# Patient Record
Sex: MALE | Race: BLACK OR AFRICAN AMERICAN | NOT HISPANIC OR LATINO | URBAN - METROPOLITAN AREA
[De-identification: names, ages, dates, MRNs, and addresses within clinical notes are randomized per-mention and may not be internally consistent; named-entity substitution may affect disease eponyms.]

---

## 2021-09-01 ENCOUNTER — EMERGENCY (EMERGENCY)
Facility: HOSPITAL | Age: 66
LOS: 1 days | Discharge: HOME | End: 2021-09-01
Admitting: EMERGENCY MEDICINE
Payer: COMMERCIAL

## 2021-09-01 VITALS
WEIGHT: 209.44 LBS | SYSTOLIC BLOOD PRESSURE: 183 MMHG | HEART RATE: 90 BPM | TEMPERATURE: 98 F | DIASTOLIC BLOOD PRESSURE: 124 MMHG | RESPIRATION RATE: 16 BRPM | OXYGEN SATURATION: 98 %

## 2021-09-01 VITALS
OXYGEN SATURATION: 98 % | DIASTOLIC BLOOD PRESSURE: 88 MMHG | RESPIRATION RATE: 18 BRPM | HEART RATE: 71 BPM | SYSTOLIC BLOOD PRESSURE: 154 MMHG | TEMPERATURE: 99 F

## 2021-09-01 DIAGNOSIS — Z20.822 CONTACT WITH AND (SUSPECTED) EXPOSURE TO COVID-19: ICD-10-CM

## 2021-09-01 DIAGNOSIS — I51.7 CARDIOMEGALY: ICD-10-CM

## 2021-09-01 DIAGNOSIS — E16.2 HYPOGLYCEMIA, UNSPECIFIED: ICD-10-CM

## 2021-09-01 LAB
ALBUMIN SERPL ELPH-MCNC: 4.1 G/DL — SIGNIFICANT CHANGE UP (ref 3.4–5)
ALP SERPL-CCNC: 73 U/L — SIGNIFICANT CHANGE UP (ref 40–120)
ALT FLD-CCNC: 26 U/L — SIGNIFICANT CHANGE UP (ref 12–42)
AMPHET UR-MCNC: NEGATIVE — SIGNIFICANT CHANGE UP
ANION GAP SERPL CALC-SCNC: 8 MMOL/L — LOW (ref 9–16)
APPEARANCE UR: CLEAR — SIGNIFICANT CHANGE UP
AST SERPL-CCNC: 27 U/L — SIGNIFICANT CHANGE UP (ref 15–37)
BARBITURATES UR SCN-MCNC: NEGATIVE — SIGNIFICANT CHANGE UP
BASOPHILS # BLD AUTO: 0.07 K/UL — SIGNIFICANT CHANGE UP (ref 0–0.2)
BASOPHILS NFR BLD AUTO: 0.6 % — SIGNIFICANT CHANGE UP (ref 0–2)
BENZODIAZ UR-MCNC: NEGATIVE — SIGNIFICANT CHANGE UP
BILIRUB SERPL-MCNC: 0.4 MG/DL — SIGNIFICANT CHANGE UP (ref 0.2–1.2)
BILIRUB UR-MCNC: NEGATIVE — SIGNIFICANT CHANGE UP
BUN SERPL-MCNC: 12 MG/DL — SIGNIFICANT CHANGE UP (ref 7–23)
CALCIUM SERPL-MCNC: 9.2 MG/DL — SIGNIFICANT CHANGE UP (ref 8.5–10.5)
CHLORIDE SERPL-SCNC: 108 MMOL/L — SIGNIFICANT CHANGE UP (ref 96–108)
CO2 SERPL-SCNC: 28 MMOL/L — SIGNIFICANT CHANGE UP (ref 22–31)
COCAINE METAB.OTHER UR-MCNC: NEGATIVE — SIGNIFICANT CHANGE UP
COLOR SPEC: YELLOW — SIGNIFICANT CHANGE UP
CREAT SERPL-MCNC: 1.2 MG/DL — SIGNIFICANT CHANGE UP (ref 0.5–1.3)
DIFF PNL FLD: ABNORMAL
EOSINOPHIL # BLD AUTO: 0.1 K/UL — SIGNIFICANT CHANGE UP (ref 0–0.5)
EOSINOPHIL NFR BLD AUTO: 0.8 % — SIGNIFICANT CHANGE UP (ref 0–6)
ETHANOL SERPL-MCNC: <3 MG/DL — SIGNIFICANT CHANGE UP
GLUCOSE BLDC GLUCOMTR-MCNC: 101 MG/DL — HIGH (ref 70–99)
GLUCOSE BLDC GLUCOMTR-MCNC: 116 MG/DL — HIGH (ref 70–99)
GLUCOSE BLDC GLUCOMTR-MCNC: 79 MG/DL — SIGNIFICANT CHANGE UP (ref 70–99)
GLUCOSE SERPL-MCNC: 67 MG/DL — LOW (ref 70–99)
GLUCOSE UR QL: >=1000
HCT VFR BLD CALC: 39.8 % — SIGNIFICANT CHANGE UP (ref 39–50)
HGB BLD-MCNC: 13.6 G/DL — SIGNIFICANT CHANGE UP (ref 13–17)
IMM GRANULOCYTES NFR BLD AUTO: 0.7 % — SIGNIFICANT CHANGE UP (ref 0–1.5)
KETONES UR-MCNC: NEGATIVE — SIGNIFICANT CHANGE UP
LACTATE SERPL-SCNC: 2 MMOL/L — SIGNIFICANT CHANGE UP (ref 0.4–2)
LEUKOCYTE ESTERASE UR-ACNC: NEGATIVE — SIGNIFICANT CHANGE UP
LIDOCAIN IGE QN: 110 U/L — SIGNIFICANT CHANGE UP (ref 73–393)
LYMPHOCYTES # BLD AUTO: 18.2 % — SIGNIFICANT CHANGE UP (ref 13–44)
LYMPHOCYTES # BLD AUTO: 2.23 K/UL — SIGNIFICANT CHANGE UP (ref 1–3.3)
MAGNESIUM SERPL-MCNC: 2 MG/DL — SIGNIFICANT CHANGE UP (ref 1.6–2.6)
MCHC RBC-ENTMCNC: 29.8 PG — SIGNIFICANT CHANGE UP (ref 27–34)
MCHC RBC-ENTMCNC: 34.2 GM/DL — SIGNIFICANT CHANGE UP (ref 32–36)
MCV RBC AUTO: 87.1 FL — SIGNIFICANT CHANGE UP (ref 80–100)
METHADONE UR-MCNC: NEGATIVE — SIGNIFICANT CHANGE UP
MONOCYTES # BLD AUTO: 0.81 K/UL — SIGNIFICANT CHANGE UP (ref 0–0.9)
MONOCYTES NFR BLD AUTO: 6.6 % — SIGNIFICANT CHANGE UP (ref 2–14)
NEUTROPHILS # BLD AUTO: 8.98 K/UL — HIGH (ref 1.8–7.4)
NEUTROPHILS NFR BLD AUTO: 73.1 % — SIGNIFICANT CHANGE UP (ref 43–77)
NITRITE UR-MCNC: NEGATIVE — SIGNIFICANT CHANGE UP
NRBC # BLD: 0 /100 WBCS — SIGNIFICANT CHANGE UP (ref 0–0)
NT-PROBNP SERPL-SCNC: 48 PG/ML — SIGNIFICANT CHANGE UP
OPIATES UR-MCNC: NEGATIVE — SIGNIFICANT CHANGE UP
PCP SPEC-MCNC: SIGNIFICANT CHANGE UP
PCP UR-MCNC: NEGATIVE — SIGNIFICANT CHANGE UP
PH UR: 7 — SIGNIFICANT CHANGE UP (ref 5–8)
PLATELET # BLD AUTO: 328 K/UL — SIGNIFICANT CHANGE UP (ref 150–400)
POTASSIUM SERPL-MCNC: 2.9 MMOL/L — CRITICAL LOW (ref 3.5–5.3)
POTASSIUM SERPL-SCNC: 2.9 MMOL/L — CRITICAL LOW (ref 3.5–5.3)
PROT SERPL-MCNC: 8 G/DL — SIGNIFICANT CHANGE UP (ref 6.4–8.2)
PROT UR-MCNC: NEGATIVE MG/DL — SIGNIFICANT CHANGE UP
RBC # BLD: 4.57 M/UL — SIGNIFICANT CHANGE UP (ref 4.2–5.8)
RBC # FLD: 14.5 % — SIGNIFICANT CHANGE UP (ref 10.3–14.5)
RBC CASTS # UR COMP ASSIST: ABNORMAL /HPF
SARS-COV-2 RNA SPEC QL NAA+PROBE: SIGNIFICANT CHANGE UP
SODIUM SERPL-SCNC: 144 MMOL/L — SIGNIFICANT CHANGE UP (ref 132–145)
SP GR SPEC: 1.01 — SIGNIFICANT CHANGE UP (ref 1–1.03)
THC UR QL: POSITIVE
TROPONIN I SERPL-MCNC: <0.017 NG/ML — LOW (ref 0.02–0.06)
UROBILINOGEN FLD QL: 0.2 E.U./DL — SIGNIFICANT CHANGE UP
WBC # BLD: 12.27 K/UL — HIGH (ref 3.8–10.5)
WBC # FLD AUTO: 12.27 K/UL — HIGH (ref 3.8–10.5)
WBC UR QL: < 5 /HPF — SIGNIFICANT CHANGE UP

## 2021-09-01 PROCEDURE — 93010 ELECTROCARDIOGRAM REPORT: CPT

## 2021-09-01 PROCEDURE — 71045 X-RAY EXAM CHEST 1 VIEW: CPT | Mod: 26

## 2021-09-01 PROCEDURE — 99285 EMERGENCY DEPT VISIT HI MDM: CPT

## 2021-09-01 RX ORDER — POTASSIUM CHLORIDE 20 MEQ
10 PACKET (EA) ORAL
Refills: 0 | Status: COMPLETED | OUTPATIENT
Start: 2021-09-01 | End: 2021-09-01

## 2021-09-01 RX ORDER — SODIUM CHLORIDE 9 MG/ML
1000 INJECTION, SOLUTION INTRAVENOUS
Refills: 0 | Status: DISCONTINUED | OUTPATIENT
Start: 2021-09-01 | End: 2021-09-04

## 2021-09-01 RX ORDER — DEXTROSE 50 % IN WATER 50 %
50 SYRINGE (ML) INTRAVENOUS ONCE
Refills: 0 | Status: COMPLETED | OUTPATIENT
Start: 2021-09-01 | End: 2021-09-01

## 2021-09-01 RX ORDER — SODIUM CHLORIDE 9 MG/ML
1000 INJECTION INTRAMUSCULAR; INTRAVENOUS; SUBCUTANEOUS ONCE
Refills: 0 | Status: COMPLETED | OUTPATIENT
Start: 2021-09-01 | End: 2021-09-01

## 2021-09-01 RX ADMIN — Medication 10 MILLIEQUIVALENT(S): at 14:14

## 2021-09-01 RX ADMIN — Medication 100 MILLIEQUIVALENT(S): at 12:36

## 2021-09-01 RX ADMIN — SODIUM CHLORIDE 200 MILLILITER(S): 9 INJECTION, SOLUTION INTRAVENOUS at 12:36

## 2021-09-01 RX ADMIN — Medication 50 MILLILITER(S): at 09:52

## 2021-09-01 RX ADMIN — Medication 50 MILLILITER(S): at 09:51

## 2021-09-01 RX ADMIN — Medication 100 MILLIEQUIVALENT(S): at 10:55

## 2021-09-01 RX ADMIN — Medication 100 MILLIEQUIVALENT(S): at 14:08

## 2021-09-01 RX ADMIN — SODIUM CHLORIDE 1000 MILLILITER(S): 9 INJECTION INTRAMUSCULAR; INTRAVENOUS; SUBCUTANEOUS at 12:17

## 2021-09-01 NOTE — ED ADULT NURSE NOTE - OBJECTIVE STATEMENT
65 y/o male who was found  at work altered with a FS of 22 in  field-  pt on arrival is awake but confused and unaware of what had happened- oral glucose given by EMS repeat FS here is 65- Pt states he took his insulin and forgot to eat breakfast- IV in place, labs drawn and sent- 2 amp of Dextrose given - pt states he is feeling much better and is A+Ox4-

## 2021-09-01 NOTE — ED PROVIDER NOTE - PATIENT PORTAL LINK FT
You can access the FollowMyHealth Patient Portal offered by [facility] by registering at the following website: [email or web address]. By joining [employer]’s FollowMyHealth portal, you will also be able to view your health information using other applications (apps) compatible with our system.

## 2021-09-01 NOTE — ED ADULT TRIAGE NOTE - CHIEF COMPLAINT QUOTE
Patient diaphoretic from work, as per EMS pt glucose was 22 md/dl on scene oral glucose given. On arrival to ED pt altered

## 2021-09-01 NOTE — ED PROVIDER NOTE - CROS ED [facility] ALL NEG
ER Documentation


Chief Complaint


Chief Complaint





CHEST PRESSURE ON AND OFF FOR A MONTH; MOSTLY AT NIGHT





HPI


This is a 34-year-old male presents to the emergency department complaining of 


chest pain for 1 month.  He states that it been intermittent.  It is localized 


to the mid sternum.  He states it is a sharp shooting pain.  There is no 


pressure-like sensation.  He does state it is worse at night.  He has no 


shortness of breath at rest or exertion.  He said no fevers or shaking or 


chills.  He denies any hemoptysis no hematemesis or melanotic stools.  He denies


a productive or nonproductive cough.  He has no night sweats or weight loss.  He


denies any recent travel or prolonged immobilization.  The patient has no family


history of coronary artery disease in his first-degree relatives.  He does state


that several months ago his father  of lung carcinoma.  He does not smoke 


tobacco.  He did not take any analgesic medication prior to arrival.





ROS


All systems reviewed and are negative except as per history of present illness.





Medications


Home Meds


Active Scripts


Naproxen* (Naprosyn*) 500 Mg Tablet, 500 MG PO BID PRN for PAIN AND/OR 


INFLAMMATION, #30 TAB


   Prov:GINNY HOFFMANN MD         19





Allergies


Allergies:  


Coded Allergies:  


     No Known Drug Allergies (Verified  Allergy, Unknown, 19)





PMhx/Soc


Medical and Surgical Hx:  pt denies Medical Hx, pt denies Surgical Hx


Hx Alcohol Use:  No


Hx Substance Use:  No


Hx Tobacco Use:  No


Smoking Status:  Never smoker





Physical Exam


Vitals





Vital Signs


  Date      Temp  Pulse  Resp  B/P (MAP)   Pulse Ox  O2          O2 Flow    FiO2


Time                                                 Delivery    Rate


   19  97.8     66    18      120/67        98  Room Air


     18:30                           (84)


   19  97.8     73    18      127/82        98


     18:15                           (97)





Physical Exam


Constitutional:Well-developed. Well-nourished.


HEENT:Normocephalic. Atraumatic.Pupils were equal round reactive to light. Moist


mucous membranes.No tonsillar exudates.


Neck: No nuchal rigidity. No lymphadenopathy. No posterior cervical spine 


tenderness or step-offs.


Respiratory: Not using accessory muscles of respiration.Lungs were clear to 


auscultation bilaterally. No rhonchi. No rales. No wheezing. 


Cardiovascular: Regular rate regular rhythm.No murmurs. No rubs were 


appreciated.S1, S2 normal. Distal pulses are palpable 2+ bilaterally.  Mid 


substernal reproducible tenderness with no crepitus no ecchymosis no flail chest


GI: Abdomen was soft. Nontender. Non Distended. No pulsatile abdominal masses or


bruits. No rebound. No guarding. Bowel sounds were present and normal. 


Muscle skeletal: Full range of motion of both the upper and lower extremities 


bilaterally.Normal muscle tone.No assymetrical calf tenderness or swelling. 


Skin: No petechia, no purpura. No lesions on the palms or the soles of the feet.


No maculopapular rash.


NEURO: Patient was alert, awake, orientated x3.No facial droop. Gait observed 


and normal with no ataxia.Speech had regular rate and rhythm. No focal 


neurological deficits.


Result Diagram:  


19





Results 24 hrs





Laboratory Tests


       Test
                                 19
18:36  19
18:37


       White Blood Count                      6.2 10^3/ul


       Red Blood Count                       5.29 10^6/ul


       Hemoglobin                               14.5 g/dl


       Hematocrit                                  44.6 %


       Mean Corpuscular Volume                    84.3 fl


       Mean Corpuscular Hemoglobin                27.4 pg


       Mean Corpuscular Hemoglobin
Concent     32.5 g/dl 
  



       Red Cell Distribution Width                 13.4 %


       Platelet Count                         263 10^3/UL


       Mean Platelet Volume                       10.1 fl


       Immature Granulocytes %                    0.200 %


       Neutrophils %                               46.9 %


       Lymphocytes %                               43.3 %


       Monocytes %                                  7.6 %


       Eosinophils %                                1.5 %


       Basophils %                                  0.5 %


       Nucleated Red Blood Cells %            0.0 /100WBC


       Immature Granulocytes #              0.010 10^3/ul


       Neutrophils #                          2.9 10^3/ul


       Lymphocytes #                          2.7 10^3/ul


       Monocytes #                            0.5 10^3/ul


       Eosinophils #                          0.1 10^3/ul


       Basophils #                            0.0 10^3/ul


       Nucleated Red Blood Cells #            0.0 10^3/ul


       Prothrombin Time                          12.3 Sec


       Prothrombin Time Ratio                         1.0


       INR International Normalized
Ratio           0.90 
  



       Activated Partial
Thromboplast Time      28.0 Sec 
  



       Sodium Level                            138 mmol/L


       Potassium Level                         3.9 mmol/L


       Chloride Level                          101 mmol/L


       Carbon Dioxide Level                     29 mmol/L


       Anion Gap                                        8


       Blood Urea Nitrogen                       10 mg/dl


       Creatinine                              0.88 mg/dl


       Est Glomerular Filtrat Rate
mL/min   > 60 mL/min 
   



       Glucose Level                             83 mg/dl


       Calcium Level                            9.4 mg/dl


       Total Bilirubin                          0.4 mg/dl


       Direct Bilirubin                        0.00 mg/dl


       Indirect Bilirubin                       0.4 mg/dl


       Aspartate Amino Transf
(AST/SGOT)         45 IU/L 
  



       Alanine Aminotransferase
(ALT/SGPT)       71 IU/L 
  



       Alkaline Phosphatase                       63 IU/L


       Creatine Kinase                           599 IU/L


       Creatine Kinase Index                          0.9


       Creatinine Kinase MB (Mass)             5.48 ng/ml


       Troponin I                           < 0.012 ng/ml


       B-Type Natriuretic Peptide                14 PG/ML


       Total Protein                             8.0 g/dl


       Albumin                                   4.5 g/dl


       Globulin                                 3.50 g/dl


       Albumin/Globulin Ratio                        1.28


       Lipase                                      89 U/L


       Ethyl Alcohol Level                  < 10.0 mg/dl


       Urine Opiates Screen                                 Negative


       Urine Barbiturates                                   Negative


       Urine Amphetamines Screen                            Negative


       Urine Benzodiazepines Screen                         Negative


       Urine Cocaine Screen                                 Negative


       Urine Cannabinoids                                   Negative





Current Medications


 Medications
   Dose
          Sig/Kaye
       Start Time
   Status  Last


 (Trade)       Ordered        Route
 PRN     Stop Time              Admin
Dose


                              Reason                                Admin


 Aspirin
       325 mg         ONCE  STAT
    19       DC           19


(Aspirin)                     PO
            18:23
                       18:57



                                             19 18:29


 Ketorolac
     30 mg          ONCE  STAT
    19       DC       



Tromethamine
                 IV
            19:46



 (Toradol)                                   19 19:50








Procedures/MDM


The patient presented to the emergency department complaining of chest pain. My 


clinical evaluation and workup was to distinguish minor causes of chest pain 


from acute life threatening cardiopulmonary causes such as myocardial 


infarction, pulmonary embolism, aortic dissection, esophageal rupture, cardiac 


tamponade, 





The patient was placed on a cardiac monitor, continuous pulse oximetry and IV 


access established by nursing staff.  The patient received aspirin with no 


improvement of his chest pain.  He did receive IV Toradol.


12 Lead EKG tracing ordered and reviewed by myself showed: 


Normal sinus rhythm of 68 bpm and no arrhythmia.


IN interval normal.


QRS duration normal.


There is a concave upstroke with ST segment elevation in aVL aVF and V3 with no 


reciprocal changes.  There was T wave inversion isolated to lead III.  The EKG 


read acute STEMI however I did not feel this was signs of ST segment elevation.


No ST segment depression. No changes consistent with acute ischemia. 








The patients chest pain was reproduced by palpation and horizontal flexion of 


the arms. It was my clinical impression that the pain was a result of 


inflammation of the skin and subcutaneous structures of the chest wall versus 


myocardial ischemia.  I felt the patient had low-risk chest pain and could 


therefore be safely discharged with close follow-up.





Departure


Diagnosis:  


   Primary Impression:  


   Costochondritis, acute


Condition:  Fair


Patient Instructions:  Costochondritis











GINNY HOFFMANN MD            2019 20:08
negative...

## 2021-09-01 NOTE — ED PROVIDER NOTE - NSICDXNOPASTSURGICALHX_GEN_ALL_ED
"Nay Anaya Patient Age: 75year old  MESSAGE: Interpreting service used: No      IM/FP- Referral- New Referral Request- External Referral- Outside [facility]-     Has the patient seen any provider in Primary Care within 1 year? Yes-         Referral to Provider:  Dr Tay Pink:  Myla Modi, [address]       Phone number for outside provider referring to:  [phone]    Fax Number for outside provider referring to:  [phone]    Referral requested to be issued by (date)/appointment date:  11.28.20    Provider that is recommending the referral:  Dr Rei Vaughn     Reason for referral request:  Toenails need clipping    Additional Information: n/a    Is caller requesting a call back once the referral has been approved? Yes    Advised caller referral requests may take 7-10 business days to complete. Is patient having new or worsening symptoms? No- Route to provider's clinical support pool         WEIGHT AND HEIGHT:   Wt Readings from Last 1 Encounters:   10/20/20 90.7 kg (200 lb)     Ht Readings from Last 1 Encounters:   10/20/20 5' 10"" (1.778 m)     BMI Readings from Last 1 Encounters:   10/20/20 28.70 kg/mÂ²       ALLERGIES:  Patient has no known allergies. Current Outpatient Medications   Medication   â¢ silver sulfADIAZINE (THERMAZENE) 1 % cream   â¢ fluticasone (FLONASE) 50 MCG/ACT nasal spray   â¢ simvastatin (ZOCOR) 20 MG tablet   â¢ atenolol (TENORMIN) 50 MG tablet   â¢ ibuprofen (MOTRIN) 800 MG tablet   â¢ Cyanocobalamin (VITAMIN B 12 PO)   â¢ Multiple Vitamins-Minerals (CENTRUM SILVER ADULT 50+) Tab   â¢ VITAMIN E PO   â¢ VITAMIN D, ERGOCALCIFEROL, PO     No current facility-administered medications for this visit.       PHARMACY to use: see below           Pharmacy preference(s) on file:   [address] [facility]  [address] [facility] [address]  Phone: [phone] Fax: " [address] to leave response (including medical information) on answering machine  ROUTING: Patient's physician/staff        PCP: Patrick Kraus MD         INS: Payor: Heena Garcia / Plan: [employer] HMO / Product Type: MEDADV   PATIENT ADDRESS:  [address] [place] [id number] <-- Click to add NO significant Past Surgical History

## 2021-09-01 NOTE — ED PROVIDER NOTE - CLINICAL SUMMARY MEDICAL DECISION MAKING FREE TEXT BOX
Patient here for hypoglycemia in the field  given glucose prior to arrival.   Patient asymptomatic in ED.  Plan: labs and infectious workup

## 2021-09-01 NOTE — ED PROVIDER NOTE - OBJECTIVE STATEMENT
65 y/o male here brought in VIA EMS from work for diaphoresis and hypoglycemia. Patient appears well asymptomatic in ED. Denies fever, chills, abdominal pain, change in bowel function, flank pain, rash, HA, dizziness, SOB, CP, palpitations, diaphoresis, cough, and malaise. Patient appears well NAD Stable.

## 2022-12-09 ENCOUNTER — NON-APPOINTMENT (OUTPATIENT)
Age: 67
End: 2022-12-09

## 2022-12-30 PROBLEM — Z78.9 OTHER SPECIFIED HEALTH STATUS: Chronic | Status: ACTIVE | Noted: 2021-09-04

## 2023-01-26 ENCOUNTER — APPOINTMENT (OUTPATIENT)
Dept: ORTHOPEDIC SURGERY | Facility: CLINIC | Age: 68
End: 2023-01-26
Payer: OTHER MISCELLANEOUS

## 2023-01-26 PROBLEM — Z00.00 ENCOUNTER FOR PREVENTIVE HEALTH EXAMINATION: Status: ACTIVE | Noted: 2023-01-26

## 2023-01-26 PROCEDURE — 72170 X-RAY EXAM OF PELVIS: CPT

## 2023-01-26 PROCEDURE — 72110 X-RAY EXAM L-2 SPINE 4/>VWS: CPT

## 2023-01-26 PROCEDURE — 99204 OFFICE O/P NEW MOD 45 MIN: CPT

## 2023-01-26 PROCEDURE — 99072 ADDL SUPL MATRL&STAF TM PHE: CPT

## 2023-01-26 NOTE — IMAGING
[Straightening consistent with spasm] : Straightening consistent with spasm [Facet arthropathy] : Facet arthropathy [Disc space narrowing] : Disc space narrowing [AP] : anteroposterior [There are no fractures, subluxations or dislocations. No significant abnormalities are seen] : There are no fractures, subluxations or dislocations. No significant abnormalities are seen [de-identified] : LSPINE\par Inspection: No rash or ecchymosis\par Palpation: No tenderness to palpation or spasm in bilateral thoracic and lumbar paraspinal musculature, no SI joint tenderness to palpation\par ROM: Full with stiffness\par Strength: 5/5 bilateral hip flexors, knee extensors, ankle dorsiflexors, EHL, ankle plantarflexors\par Sensation: diminished sensation to light touch in RLE above and below the knee\par Provocative maneuvers: Equivocal R straight leg raise, negative L straight leg raise\par  [FreeTextEntry1] : Diffuse DDD, most severe at L4-5/L5-S1

## 2023-01-26 NOTE — ASSESSMENT
[FreeTextEntry1] : L4-5/L5-S1 DDD with RLE radic\par PT, meds\par Patient has failed  conservative care, including medications. Will obtain lumbar MRI to rule out HNP; will also be used to guide potential future injections/surgical management.\par follow up after MRI\par Patient is currently OOW\par Patient's symptoms have improved since seeing PCP 1 month ago. Will trial return to work light duty on 1/31/23, with caution.  No bending/twisting, lifting >10 lbs, or standing for >1 hour. Will reassess at MRI f/u appointment. \par \par NSAIDs- Patient warned of risk of medication to GI tract, increased blood pressure, cardiac risk, and risk of fluid retention.  Advised to clear medication with internist or PCP if any concurrent health problem with heart, blood pressure, or GI system exists.\par \par Gabapentin- Patient advised of sedating effects, instructed not to drive, operate machinery, or take with other sedating medications. Advised of need to taper on/off medication and risk of abruptly stopping gabapentin.\par \par Patient seen by Elsa Dodge PA-C, with and under the supervision of  Dr. Jacques Middleton M.D.

## 2023-01-26 NOTE — HISTORY OF PRESENT ILLNESS
[Lower back] : lower back [6] : 6 [5] : 5 [Dull/Aching] : dull/aching [Intermittent] : intermittent [Meds] : meds [Walking] : walking [de-identified] : WC DOI 12/8/22\par Occupation: maintenance\par 1/26/23- 68 y/o M presents for lower back pain, R>L, X approx. 1 month. Patient was moving heavy tables at work and started feeling pain shortly afterwards. Associated radiation down RLE (glutes,anterolateral thigh, lateral calf, dorsal foot, with numbness in the foot) Aggravated by walking/getting out of bed. Seen by PCP approx. 1 month ago and RXed pain patches and a pain reliever (does not recall names), with partial relief. Denies prior back surgeries/physical therapy. Denies b/b dysfunction. \par \par PMH: HTN, Type II DM (insulin-dependent) [] : no [FreeTextEntry5] : JONAH 67 year old M here for Lower back , onset pain for over 1 month,last saw his PCP was was given a Pain patch and a Pain reliever ,pt is unsure of the names , he  reports pain has decreased \par his pain radiated from his lower back down to his buttock and to the back of his knee  [FreeTextEntry7] : lower back to buttock to back of his knee  [FreeTextEntry9] : pain patch  [de-identified] : 1 month ago  [de-identified] : PCP

## 2023-01-26 NOTE — WORK
[Sprain/Strain] : sprain/strain [Was the competent medical cause of the injury] : was the competent medical cause of the injury [Are consistent with the injury] : are consistent with the injury [Consistent with my objective findings] : consistent with my objective findings [Partial] : partial [Does not reveal pre-existing condition(s) that may affect treatment/prognosis] : does not reveal pre-existing condition(s) that may affect treatment/prognosis [Can return to work with limitations on ______] : can return to work with limitations on [unfilled] [Bending/Twisting] : bending/twisting [Lifting] : lifting [Unknown at this time] : : unknown at this time [Patient] : patient [Rx may affect patient's ability to return to work, make patient drowsy, or other issue] : Rx may affect patient's ability to return to work, make patient drowsy, or other issue. [I provided the services listed above] :  I provided the services listed above. [FreeTextEntry1] : fair

## 2023-01-26 NOTE — RETURN TO WORK/SCHOOL
[Return Date: _____] : as of [unfilled].  This has been discussed in detail with ~Sheldon~ and ~he/she~ understands this. [Light Duty] : light duty [FreeTextEntry1] : Patient will return to work light duty. No bending/twisting, lifting >10 lbs, or standing for >1 hour.

## 2023-02-01 ENCOUNTER — APPOINTMENT (OUTPATIENT)
Dept: MRI IMAGING | Facility: CLINIC | Age: 68
End: 2023-02-01

## 2023-02-09 ENCOUNTER — FORM ENCOUNTER (OUTPATIENT)
Age: 68
End: 2023-02-09

## 2023-02-10 ENCOUNTER — APPOINTMENT (OUTPATIENT)
Dept: MRI IMAGING | Facility: CLINIC | Age: 68
End: 2023-02-10
Payer: OTHER MISCELLANEOUS

## 2023-02-10 PROCEDURE — 99072 ADDL SUPL MATRL&STAF TM PHE: CPT

## 2023-02-10 PROCEDURE — 72148 MRI LUMBAR SPINE W/O DYE: CPT

## 2023-02-16 ENCOUNTER — APPOINTMENT (OUTPATIENT)
Dept: ORTHOPEDIC SURGERY | Facility: CLINIC | Age: 68
End: 2023-02-16
Payer: OTHER MISCELLANEOUS

## 2023-02-16 PROCEDURE — 99215 OFFICE O/P EST HI 40 MIN: CPT

## 2023-02-16 PROCEDURE — 99072 ADDL SUPL MATRL&STAF TM PHE: CPT

## 2023-02-16 NOTE — WORK
[Sprain/Strain] : sprain/strain [Was the competent medical cause of the injury] : was the competent medical cause of the injury [Are consistent with the injury] : are consistent with the injury [Consistent with my objective findings] : consistent with my objective findings [Partial] : partial [Does not reveal pre-existing condition(s) that may affect treatment/prognosis] : does not reveal pre-existing condition(s) that may affect treatment/prognosis [Unknown at this time] : : unknown at this time [Patient] : patient [Rx may affect patient's ability to return to work, make patient drowsy, or other issue] : Rx may affect patient's ability to return to work, make patient drowsy, or other issue. [I provided the services listed above] :  I provided the services listed above. [Other: ___] : [unfilled] [Can return to work without limitations on ______] : can return to work without limitations on [unfilled] [FreeTextEntry1] : fair

## 2023-02-16 NOTE — ASSESSMENT
[FreeTextEntry1] :  L4/5 bulge with R>L LR and foraminal HNP;\par L5/S1 HNP with b/l NF narrowing\par PT, meds\par \par Patient is currently OOW\par Patient's symptoms have improved since last visit, will trial return to work full duty and re-assess at f/u or sooner should symptoms worsen. \par \par NSAIDs- Patient warned of risk of medication to GI tract, increased blood pressure, cardiac risk, and risk of fluid retention.  Advised to clear medication with internist or PCP if any concurrent health problem with heart, blood pressure, or GI system exists.\par \par Gabapentin- Patient advised of sedating effects, instructed not to drive, operate machinery, or take with other sedating medications. Advised of need to taper on/off medication and risk of abruptly stopping gabapentin.\par \par PCP for renal findings

## 2023-02-16 NOTE — HISTORY OF PRESENT ILLNESS
[Lower back] : lower back [6] : 6 [5] : 5 [Dull/Aching] : dull/aching [Intermittent] : intermittent [Meds] : meds [Walking] : walking [de-identified] : L spine MRI 2/10/23-\par Findings: Straightening of lordosis. No compression fracture. Multilevel Schmorl's nodes. Modic type 2 \par endplate change at L5-S1. No compression fracture. No spondylolysis.\par T12-L1: No herniation, foraminal stenosis, or central stenosis.\par L1-L2: Bulge, facet hypertrophy with no herniation, foraminal stenosis, or central stenosis.\par L2-L3: Minor retrolisthesis. Bulge, facet hypertrophy, ligamentum flavum hypertrophy, and left facet effusion \par with foraminal stenosis. Asymmetric to right herniation impressing on the thecal sac. Posterior epidural \par lipomatosis. Mild central stenosis.\par L3-L4: Bulge, facet hypertrophy, and ligamentum flavum hypertrophy with left foraminal herniation and left \par foraminal stenosis. No central herniation. Posterior epidural lipomatosis. Mild central stenosis.\par L4-L5: Broad bulge, spondylotic ridge, facet arthrosis, ligamentum flavum hypertrophy, and left facet effusion.\par Foraminal extension and bulges with right foraminal herniation and foraminal stenosis right greater than left.\par Broad central herniation with posterior epidural lipomatosis and mild-to-moderate central stenosis.\par L5-S1: Minor retrolisthesis. Broad bulge, spondylotic ridge, facet arthrosis, ligamentum flavum hypertrophy\par with foraminal extension of bulges, and foraminal stenosis. Broad central herniation. Mild central stenosis.\par Ind. 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 1 month. Patient was moving heavy tables at work and started feeling pain shortly afterwards. Associated radiation down RLE (glutes,anterolateral thigh, lateral calf, dorsal foot, with numbness in the foot) Aggravated by walking/getting out of bed. Seen by PCP approx. 1 month ago and RXed pain patches and a pain reliever (does not recall names), with partial relief. Denies prior back surgeries/physical therapy. Denies b/b dysfunction. \par \par PMH: HTN, Type II DM (insulin-dependent)\par \par 2/16/23- MRI f/u. Still having some pain down the RLE posterolaterally to the knee, though improving with time and meds [] : no [FreeTextEntry5] : JONAH 67 year old M here for MRI review of the L-spine,reports some improvement since last visit  [FreeTextEntry7] : lower back to buttock to back of his knee  [FreeTextEntry9] : pain patch  [de-identified] : 1 month ago  [de-identified] : PCP

## 2023-02-16 NOTE — RETURN TO WORK/SCHOOL
[Return Date: _____] : as of [unfilled].  This has been discussed in detail with ~Sheldon~ and ~he/she~ understands this. [Full Duty] : full duty

## 2023-02-16 NOTE — IMAGING
[Straightening consistent with spasm] : Straightening consistent with spasm [Facet arthropathy] : Facet arthropathy [Disc space narrowing] : Disc space narrowing [AP] : anteroposterior [There are no fractures, subluxations or dislocations. No significant abnormalities are seen] : There are no fractures, subluxations or dislocations. No significant abnormalities are seen [de-identified] : LSPINE\par Palpation: No tenderness to palpation or spasm in bilateral thoracic and lumbar paraspinal musculature, no SI joint tenderness to palpation\par ROM: Full with stiffness\par Strength: 5/5 bilateral hip flexors, knee extensors, ankle dorsiflexors, EHL, ankle plantarflexors\par Sensation: diminished sensation to light touch in RLE above and below the knee\par Provocative maneuvers: Equivocal R straight leg raise, negative L straight leg raise\par  [FreeTextEntry1] : Diffuse DDD, most severe at L4-5/L5-S1

## 2023-02-16 NOTE — REASON FOR VISIT
Take cephalexin 500 mg four times daily for 7 days.    Let us know if your knee is not getting better.    [FreeTextEntry2] : Follow Up; MRI review of the L -spine

## 2023-03-10 ENCOUNTER — FORM ENCOUNTER (OUTPATIENT)
Age: 68
End: 2023-03-10

## 2023-03-30 ENCOUNTER — APPOINTMENT (OUTPATIENT)
Dept: ORTHOPEDIC SURGERY | Facility: CLINIC | Age: 68
End: 2023-03-30
Payer: OTHER MISCELLANEOUS

## 2023-03-30 PROCEDURE — 99213 OFFICE O/P EST LOW 20 MIN: CPT

## 2023-03-30 NOTE — WORK
[Sprain/Strain] : sprain/strain [Other: ___] : [unfilled] [Was the competent medical cause of the injury] : was the competent medical cause of the injury [Are consistent with the injury] : are consistent with the injury [Consistent with my objective findings] : consistent with my objective findings [Partial] : partial [Does not reveal pre-existing condition(s) that may affect treatment/prognosis] : does not reveal pre-existing condition(s) that may affect treatment/prognosis [Can return to work without limitations on ______] : can return to work without limitations on [unfilled] [Unknown at this time] : : unknown at this time [Patient] : patient [Rx may affect patient's ability to return to work, make patient drowsy, or other issue] : Rx may affect patient's ability to return to work, make patient drowsy, or other issue. [I provided the services listed above] :  I provided the services listed above. [FreeTextEntry1] : fair

## 2023-03-30 NOTE — ASSESSMENT
[FreeTextEntry1] :  L4/5 bulge with R>L LR and foraminal HNP;\par L5/S1 HNP with b/l NF narrowing\par PT, meds\par \par \par NSAIDs- Patient warned of risk of medication to GI tract, increased blood pressure, cardiac risk, and risk of fluid retention.  Advised to clear medication with internist or PCP if any concurrent health problem with heart, blood pressure, or GI system exists.\par \par Gabapentin- Patient advised of sedating effects, instructed not to drive, operate machinery, or take with other sedating medications. Advised of need to taper on/off medication and risk of abruptly stopping gabapentin.\par \par PCP for renal findings\par \par Patient seen by Elsa Dodge PA-C, with and under the supervision of Dr. Jacques Middleton M.D.

## 2023-03-30 NOTE — IMAGING
[Straightening consistent with spasm] : Straightening consistent with spasm [Facet arthropathy] : Facet arthropathy [Disc space narrowing] : Disc space narrowing [AP] : anteroposterior [There are no fractures, subluxations or dislocations. No significant abnormalities are seen] : There are no fractures, subluxations or dislocations. No significant abnormalities are seen [FreeTextEntry1] : Diffuse DDD, most severe at L4-5/L5-S1  [de-identified] : LSPINE\par Palpation: No tenderness to palpation or spasm in bilateral thoracic and lumbar paraspinal musculature, no SI joint tenderness to palpation\par ROM: Full with stiffness\par Strength: 5/5 bilateral hip flexors, knee extensors, ankle dorsiflexors, EHL, ankle plantarflexors\par Sensation: diminished sensation to light touch in RLE above and below the knee\par Provocative maneuvers: Equivocal R straight leg raise, negative L straight leg raise\par

## 2023-03-30 NOTE — HISTORY OF PRESENT ILLNESS
[Lower back] : lower back [6] : 6 [5] : 5 [Dull/Aching] : dull/aching [Intermittent] : intermittent [Meds] : meds [Walking] : walking [de-identified] : L spine MRI 2/10/23-\par Findings: Straightening of lordosis. No compression fracture. Multilevel Schmorl's nodes. Modic type 2 \par endplate change at L5-S1. No compression fracture. No spondylolysis.\par T12-L1: No herniation, foraminal stenosis, or central stenosis.\par L1-L2: Bulge, facet hypertrophy with no herniation, foraminal stenosis, or central stenosis.\par L2-L3: Minor retrolisthesis. Bulge, facet hypertrophy, ligamentum flavum hypertrophy, and left facet effusion \par with foraminal stenosis. Asymmetric to right herniation impressing on the thecal sac. Posterior epidural \par lipomatosis. Mild central stenosis.\par L3-L4: Bulge, facet hypertrophy, and ligamentum flavum hypertrophy with left foraminal herniation and left \par foraminal stenosis. No central herniation. Posterior epidural lipomatosis. Mild central stenosis.\par L4-L5: Broad bulge, spondylotic ridge, facet arthrosis, ligamentum flavum hypertrophy, and left facet effusion.\par Foraminal extension and bulges with right foraminal herniation and foraminal stenosis right greater than left.\par Broad central herniation with posterior epidural lipomatosis and mild-to-moderate central stenosis.\par L5-S1: Minor retrolisthesis. Broad bulge, spondylotic ridge, facet arthrosis, ligamentum flavum hypertrophy\par with foraminal extension of bulges, and foraminal stenosis. Broad central herniation. Mild central stenosis.\par Ind. 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 1 month. Patient was moving heavy tables at work and started feeling pain shortly afterwards. Associated radiation down RLE (glutes,anterolateral thigh, lateral calf, dorsal foot, with numbness in the foot) Aggravated by walking/getting out of bed. Seen by PCP approx. 1 month ago and RXed pain patches and a pain reliever (does not recall names), with partial relief. Denies prior back surgeries/physical therapy. Denies b/b dysfunction. \par \par PMH: HTN, Type II DM (insulin-dependent)\par \par 2/16/23- MRI f/u. Still having some pain down the RLE posterolaterally to the knee, though improving with time and meds\par \par 3/30/23- Continued lower back pain with radiation down RLE posterolaterally to the knee, although improving. Has been tolerating work (full duty).  [] : no [FreeTextEntry5] : JONAH 67 year old M here for followup  L-spine,reports on going aching when standing up from a sitting positions  [FreeTextEntry7] : lower back to buttock to back of his knee  [FreeTextEntry9] : pain patch  [de-identified] : 1 month ago  [de-identified] : PCP

## 2023-06-01 ENCOUNTER — APPOINTMENT (OUTPATIENT)
Dept: ORTHOPEDIC SURGERY | Facility: CLINIC | Age: 68
End: 2023-06-01
Payer: OTHER MISCELLANEOUS

## 2023-06-01 DIAGNOSIS — S39.012A STRAIN OF MUSCLE, FASCIA AND TENDON OF LOWER BACK, INITIAL ENCOUNTER: ICD-10-CM

## 2023-06-01 PROCEDURE — 99214 OFFICE O/P EST MOD 30 MIN: CPT

## 2023-06-01 NOTE — ASSESSMENT
[FreeTextEntry1] :  L4/5 bulge with R>L LR and foraminal HNP;\par L5/S1 HNP with b/l NF narrowing\par PT, meds\par \par \par NSAIDs- Patient warned of risk of medication to GI tract, increased blood pressure, cardiac risk, and risk of fluid retention.  Advised to clear medication with internist or PCP if any concurrent health problem with heart, blood pressure, or GI system exists.\par \par Gabapentin- Patient advised of sedating effects, instructed not to drive, operate machinery, or take with other sedating medications. Advised of need to taper on/off medication and risk of abruptly stopping gabapentin.\par \par reiterated PCP for renal findings\par \par Patient seen by Elsa Dodge PA-C, with and under the supervision of Dr. Jacques Middleton M.D.

## 2023-06-01 NOTE — IMAGING
[de-identified] : LSPINE\par Palpation: No tenderness to palpation or spasm in bilateral thoracic and lumbar paraspinal musculature, no SI joint tenderness to palpation\par ROM: Full with stiffness\par Strength: 5/5 bilateral hip flexors, knee extensors, ankle dorsiflexors, EHL, ankle plantarflexors\par Sensation: diminished sensation to light touch in RLE above and below the knee\par Provocative maneuvers: Equivocal R straight leg raise, negative L straight leg raise\par

## 2023-06-01 NOTE — HISTORY OF PRESENT ILLNESS
[Lower back] : lower back [6] : 6 [5] : 5 [Dull/Aching] : dull/aching [Intermittent] : intermittent [Meds] : meds [Walking] : walking [de-identified] : L spine MRI 2/10/23-\par Findings: Straightening of lordosis. No compression fracture. Multilevel Schmorl's nodes. Modic type 2 \par endplate change at L5-S1. No compression fracture. No spondylolysis.\par T12-L1: No herniation, foraminal stenosis, or central stenosis.\par L1-L2: Bulge, facet hypertrophy with no herniation, foraminal stenosis, or central stenosis.\par L2-L3: Minor retrolisthesis. Bulge, facet hypertrophy, ligamentum flavum hypertrophy, and left facet effusion \par with foraminal stenosis. Asymmetric to right herniation impressing on the thecal sac. Posterior epidural \par lipomatosis. Mild central stenosis.\par L3-L4: Bulge, facet hypertrophy, and ligamentum flavum hypertrophy with left foraminal herniation and left \par foraminal stenosis. No central herniation. Posterior epidural lipomatosis. Mild central stenosis.\par L4-L5: Broad bulge, spondylotic ridge, facet arthrosis, ligamentum flavum hypertrophy, and left facet effusion.\par Foraminal extension and bulges with right foraminal herniation and foraminal stenosis right greater than left.\par Broad central herniation with posterior epidural lipomatosis and mild-to-moderate central stenosis.\par L5-S1: Minor retrolisthesis. Broad bulge, spondylotic ridge, facet arthrosis, ligamentum flavum hypertrophy\par with foraminal extension of bulges, and foraminal stenosis. Broad central herniation. Mild central stenosis.\par Ind. 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 1 month. Patient was moving heavy tables at work and started feeling pain shortly afterwards. Associated radiation down RLE (glutes,anterolateral thigh, lateral calf, dorsal foot, with numbness in the foot) Aggravated by walking/getting out of bed. Seen by PCP approx. 1 month ago and RXed pain patches and a pain reliever (does not recall names), with partial relief. Denies prior back surgeries/physical therapy. Denies b/b dysfunction. \par \par PMH: HTN, Type II DM (insulin-dependent)\par \par 2/16/23- MRI f/u. Still having some pain down the RLE posterolaterally to the knee, though improving with time and meds\par \par 3/30/23- Continued lower back pain with radiation down RLE posterolaterally to the knee, although improving. Has been tolerating work (full duty). \par \par 6/1/23- Continued lower back pain with radiation down RLE posterolaterally to the knee. Is starting PT next week.  [] : no [FreeTextEntry5] : JONAH 67 year old M here for followup  L-spine,reports on going symptoms  [FreeTextEntry7] : lower back to buttock to back of his knee  [FreeTextEntry9] : pain patch  [de-identified] : 1 month ago  [de-identified] : PCP

## 2023-08-17 ENCOUNTER — APPOINTMENT (OUTPATIENT)
Dept: ORTHOPEDIC SURGERY | Facility: CLINIC | Age: 68
End: 2023-08-17
Payer: OTHER MISCELLANEOUS

## 2023-08-17 PROCEDURE — 99213 OFFICE O/P EST LOW 20 MIN: CPT

## 2023-08-17 NOTE — HISTORY OF PRESENT ILLNESS
[Work related] : work related [de-identified] : L spine MRI 2/10/23- Findings: Straightening of lordosis. No compression fracture. Multilevel Schmorl's nodes. Modic type 2  endplate change at L5-S1. No compression fracture. No spondylolysis. T12-L1: No herniation, foraminal stenosis, or central stenosis. L1-L2: Bulge, facet hypertrophy with no herniation, foraminal stenosis, or central stenosis. L2-L3: Minor retrolisthesis. Bulge, facet hypertrophy, ligamentum flavum hypertrophy, and left facet effusion  with foraminal stenosis. Asymmetric to right herniation impressing on the thecal sac. Posterior epidural  lipomatosis. Mild central stenosis. L3-L4: Bulge, facet hypertrophy, and ligamentum flavum hypertrophy with left foraminal herniation and left  foraminal stenosis. No central herniation. Posterior epidural lipomatosis. Mild central stenosis. L4-L5: Broad bulge, spondylotic ridge, facet arthrosis, ligamentum flavum hypertrophy, and left facet effusion. Foraminal extension and bulges with right foraminal herniation and foraminal stenosis right greater than left. Broad central herniation with posterior epidural lipomatosis and mild-to-moderate central stenosis. L5-S1: Minor retrolisthesis. Broad bulge, spondylotic ridge, facet arthrosis, ligamentum flavum hypertrophy with foraminal extension of bulges, and foraminal stenosis. Broad central herniation. Mild central stenosis. Ind. 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 1 month. Patient was moving heavy tables at work and started feeling pain shortly afterwards. Associated radiation down RLE (glutes,anterolateral thigh, lateral calf, dorsal foot, with numbness in the foot) Aggravated by walking/getting out of bed. Seen by PCP approx. 1 month ago and RXed pain patches and a pain reliever (does not recall names), with partial relief. Denies prior back surgeries/physical therapy. Denies b/b dysfunction.   PMH: HTN, Type II DM (insulin-dependent)  2/16/23- MRI f/u. Still having some pain down the RLE posterolaterally to the knee, though improving with time and meds  3/30/23- Continued lower back pain with radiation down RLE posterolaterally to the knee, although improving. Has been tolerating work (full duty).   6/1/23- Continued lower back pain with radiation down RLE posterolaterally to the knee. Is starting PT next week.   8/17/23- Still intermittent LBP. Notes subjective BLE weakness, without pain. Denies regina b/b dysfunction.  [FreeTextEntry3] : 12/08/22 [FreeTextEntry5] : JONAH is here today to follow up on his lower back.pt reports pain is intermittent.

## 2023-08-17 NOTE — IMAGING
[de-identified] : LSPINE\par  Palpation: No tenderness to palpation or spasm in bilateral thoracic and lumbar paraspinal musculature, no SI joint tenderness to palpation\par  ROM: Full with stiffness\par  Strength: 5/5 bilateral hip flexors, knee extensors, ankle dorsiflexors, EHL, ankle plantarflexors\par  Sensation: diminished sensation to light touch in RLE above and below the knee\par  Provocative maneuvers: Equivocal R straight leg raise, negative L straight leg raise\par

## 2023-08-17 NOTE — ASSESSMENT
[FreeTextEntry1] :  L4/5 bulge with R>L LR and foraminal HNP; L5/S1 HNP with b/l NF narrowing PT, meds  NSAIDs- Patient warned of risk of medication to GI tract, increased blood pressure, cardiac risk, and risk of fluid retention.  Advised to clear medication with internist or PCP if any concurrent health problem with heart, blood pressure, or GI system exists.  Gabapentin- Patient advised of sedating effects, instructed not to drive, operate machinery, or take with other sedating medications. Advised of need to taper on/off medication and risk of abruptly stopping gabapentin.  saw PCP for renal findings  Patient seen by Elsa Dodge PA-C, with and under the supervision of Dr. Jacques Middleton M.D.

## 2023-10-19 ENCOUNTER — APPOINTMENT (OUTPATIENT)
Dept: ORTHOPEDIC SURGERY | Facility: CLINIC | Age: 68
End: 2023-10-19
Payer: OTHER MISCELLANEOUS

## 2023-10-19 PROCEDURE — 99214 OFFICE O/P EST MOD 30 MIN: CPT

## 2023-12-14 ENCOUNTER — APPOINTMENT (OUTPATIENT)
Dept: ORTHOPEDIC SURGERY | Facility: CLINIC | Age: 68
End: 2023-12-14
Payer: OTHER MISCELLANEOUS

## 2023-12-14 PROCEDURE — 99214 OFFICE O/P EST MOD 30 MIN: CPT

## 2023-12-14 RX ORDER — NAPROXEN 500 MG/1
500 TABLET ORAL
Qty: 60 | Refills: 0 | Status: ACTIVE | COMMUNITY
Start: 2023-01-26 | End: 1900-01-01

## 2023-12-14 NOTE — IMAGING
[de-identified] : LSPINE\par  Palpation: No tenderness to palpation or spasm in bilateral thoracic and lumbar paraspinal musculature, no SI joint tenderness to palpation\par  ROM: Full with stiffness\par  Strength: 5/5 bilateral hip flexors, knee extensors, ankle dorsiflexors, EHL, ankle plantarflexors\par  Sensation: diminished sensation to light touch in RLE above and below the knee\par  Provocative maneuvers: Equivocal R straight leg raise, negative L straight leg raise\par

## 2023-12-14 NOTE — ASSESSMENT
[FreeTextEntry1] : L4/5 bulge with R>L LR and foraminal HNP; L5/S1 HNP with b/l NF narrowing PT, meds  NSAIDs- Patient warned of risk of medication to GI tract, increased blood pressure, cardiac risk, and risk of fluid retention. Advised to clear medication with internist or PCP if any concurrent health problem with heart, blood pressure, or GI system exists.  Gabapentin- Patient advised of sedating effects, instructed not to drive, operate machinery, or take with other sedating medications. Advised of need to taper on/off medication and risk of abruptly stopping gabapentin.  Patient seen by Elsa Dodge PA-C, with and under the supervision of  Dr. Jacques Middleton M.D.

## 2023-12-14 NOTE — HISTORY OF PRESENT ILLNESS
[Work related] : work related [de-identified] : L spine MRI 2/10/23- Findings: Straightening of lordosis. No compression fracture. Multilevel Schmorl's nodes. Modic type 2  endplate change at L5-S1. No compression fracture. No spondylolysis. T12-L1: No herniation, foraminal stenosis, or central stenosis. L1-L2: Bulge, facet hypertrophy with no herniation, foraminal stenosis, or central stenosis. L2-L3: Minor retrolisthesis. Bulge, facet hypertrophy, ligamentum flavum hypertrophy, and left facet effusion  with foraminal stenosis. Asymmetric to right herniation impressing on the thecal sac. Posterior epidural  lipomatosis. Mild central stenosis. L3-L4: Bulge, facet hypertrophy, and ligamentum flavum hypertrophy with left foraminal herniation and left  foraminal stenosis. No central herniation. Posterior epidural lipomatosis. Mild central stenosis. L4-L5: Broad bulge, spondylotic ridge, facet arthrosis, ligamentum flavum hypertrophy, and left facet effusion. Foraminal extension and bulges with right foraminal herniation and foraminal stenosis right greater than left. Broad central herniation with posterior epidural lipomatosis and mild-to-moderate central stenosis. L5-S1: Minor retrolisthesis. Broad bulge, spondylotic ridge, facet arthrosis, ligamentum flavum hypertrophy with foraminal extension of bulges, and foraminal stenosis. Broad central herniation. Mild central stenosis. Ind. 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 1 month. Patient was moving heavy tables at work and started feeling pain shortly afterwards. Associated radiation down RLE (glutes,anterolateral thigh, lateral calf, dorsal foot, with numbness in the foot) Aggravated by walking/getting out of bed. Seen by PCP approx. 1 month ago and RXed pain patches and a pain reliever (does not recall names), with partial relief. Denies prior back surgeries/physical therapy. Denies b/b dysfunction.   PMH: HTN, Type II DM (insulin-dependent)  2/16/23- MRI f/u. Still having some pain down the RLE posterolaterally to the knee, though improving with time and meds  3/30/23- Continued lower back pain with radiation down RLE posterolaterally to the knee, although improving. Has been tolerating work (full duty).   6/1/23- Continued lower back pain with radiation down RLE posterolaterally to the knee. Is starting PT next week.   8/17/23- Still intermittent LBP. Notes subjective BLE weakness, without pain. Denies regina b/b dysfunction.  10/19/23- Still LBP with pain down the RLE posterolaterally to the foot. Reports PT has been approved.  12/14/23- Continued LBP. Radiation down RLE posterolaterally to calf. Reports relief with naproxen/PT, although further PT has been denied. No b/b dysfunction reported. [FreeTextEntry3] : 12/08/22 [FreeTextEntry5] :  12/14/2023: JONAH is here for lower back pain, states is having ongoing pain, states he has taken naproxen which has helped with the pain but went once to physical therapy which helped got deny due workers comp.

## 2023-12-21 ENCOUNTER — APPOINTMENT (OUTPATIENT)
Dept: ORTHOPEDIC SURGERY | Facility: CLINIC | Age: 68
End: 2023-12-21

## 2024-01-25 ENCOUNTER — APPOINTMENT (OUTPATIENT)
Dept: ORTHOPEDIC SURGERY | Facility: CLINIC | Age: 69
End: 2024-01-25

## 2024-02-29 ENCOUNTER — APPOINTMENT (OUTPATIENT)
Dept: ORTHOPEDIC SURGERY | Facility: CLINIC | Age: 69
End: 2024-02-29
Payer: OTHER MISCELLANEOUS

## 2024-02-29 PROCEDURE — 99213 OFFICE O/P EST LOW 20 MIN: CPT

## 2024-02-29 NOTE — IMAGING
[de-identified] : LSPINE\par  Palpation: No tenderness to palpation or spasm in bilateral thoracic and lumbar paraspinal musculature, no SI joint tenderness to palpation\par  ROM: Full with stiffness\par  Strength: 5/5 bilateral hip flexors, knee extensors, ankle dorsiflexors, EHL, ankle plantarflexors\par  Sensation: diminished sensation to light touch in RLE above and below the knee\par  Provocative maneuvers: Equivocal R straight leg raise, negative L straight leg raise\par

## 2024-02-29 NOTE — WORK
[Sprain/Strain] : sprain/strain [Other: ___] : [unfilled] [Was the competent medical cause of the injury] : was the competent medical cause of the injury [Are consistent with the injury] : are consistent with the injury [Consistent with my objective findings] : consistent with my objective findings [Does not reveal pre-existing condition(s) that may affect treatment/prognosis] : does not reveal pre-existing condition(s) that may affect treatment/prognosis [Partial] : partial [Can return to work without limitations on ______] : can return to work without limitations on [unfilled] [Unknown at this time] : : unknown at this time [Patient] : patient [Rx may affect patient's ability to return to work, make patient drowsy, or other issue] : Rx may affect patient's ability to return to work, make patient drowsy, or other issue. [I provided the services listed above] :  I provided the services listed above. [FreeTextEntry1] : fair

## 2024-02-29 NOTE — HISTORY OF PRESENT ILLNESS
[Work related] : work related [de-identified] : L spine MRI 2/10/23- Findings: Straightening of lordosis. No compression fracture. Multilevel Schmorl's nodes. Modic type 2  endplate change at L5-S1. No compression fracture. No spondylolysis. T12-L1: No herniation, foraminal stenosis, or central stenosis. L1-L2: Bulge, facet hypertrophy with no herniation, foraminal stenosis, or central stenosis. L2-L3: Minor retrolisthesis. Bulge, facet hypertrophy, ligamentum flavum hypertrophy, and left facet effusion  with foraminal stenosis. Asymmetric to right herniation impressing on the thecal sac. Posterior epidural  lipomatosis. Mild central stenosis. L3-L4: Bulge, facet hypertrophy, and ligamentum flavum hypertrophy with left foraminal herniation and left  foraminal stenosis. No central herniation. Posterior epidural lipomatosis. Mild central stenosis. L4-L5: Broad bulge, spondylotic ridge, facet arthrosis, ligamentum flavum hypertrophy, and left facet effusion. Foraminal extension and bulges with right foraminal herniation and foraminal stenosis right greater than left. Broad central herniation with posterior epidural lipomatosis and mild-to-moderate central stenosis. L5-S1: Minor retrolisthesis. Broad bulge, spondylotic ridge, facet arthrosis, ligamentum flavum hypertrophy with foraminal extension of bulges, and foraminal stenosis. Broad central herniation. Mild central stenosis. Ind. 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 1 month. Patient was moving heavy tables at work and started feeling pain shortly afterwards. Associated radiation down RLE (glutes,anterolateral thigh, lateral calf, dorsal foot, with numbness in the foot) Aggravated by walking/getting out of bed. Seen by PCP approx. 1 month ago and RXed pain patches and a pain reliever (does not recall names), with partial relief. Denies prior back surgeries/physical therapy. Denies b/b dysfunction.   PMH: HTN, Type II DM (insulin-dependent)  2/16/23- MRI f/u. Still having some pain down the RLE posterolaterally to the knee, though improving with time and meds  3/30/23- Continued lower back pain with radiation down RLE posterolaterally to the knee, although improving. Has been tolerating work (full duty).   6/1/23- Continued lower back pain with radiation down RLE posterolaterally to the knee. Is starting PT next week.   8/17/23- Still intermittent LBP. Notes subjective BLE weakness, without pain. Denies regina b/b dysfunction.  10/19/23- Still LBP with pain down the RLE posterolaterally to the foot. Reports PT has been approved.  12/14/23- Continued LBP. Radiation down RLE posterolaterally to calf. Reports relief with naproxen/PT, although further PT has been denied. No b/b dysfunction reported. 2/29/24-Patient is here today to follow up on lower back pain. He reports no change since the last visit. Intermittent radiation down RLE posterolaterally to calf.  He is still awaiting PT approval and states that Gabapentin and Naproxen has been giving him relief. Patient has recently retired.  [FreeTextEntry3] : 12/08/22 [FreeTextEntry5] : 02/29/2024: Patient is here today to follow up on lower back pain. He reports no change since the last visit. He is still awaiting PT approval and states that Gabapentin and Naproxen has been giving him relief.

## 2024-04-25 ENCOUNTER — APPOINTMENT (OUTPATIENT)
Dept: ORTHOPEDIC SURGERY | Facility: CLINIC | Age: 69
End: 2024-04-25

## 2024-05-09 ENCOUNTER — APPOINTMENT (OUTPATIENT)
Dept: ORTHOPEDIC SURGERY | Facility: CLINIC | Age: 69
End: 2024-05-09
Payer: OTHER MISCELLANEOUS

## 2024-05-09 DIAGNOSIS — S39.012D STRAIN OF MUSCLE, FASCIA AND TENDON OF LOWER BACK, SUBSEQUENT ENCOUNTER: ICD-10-CM

## 2024-05-09 DIAGNOSIS — M54.16 RADICULOPATHY, LUMBAR REGION: ICD-10-CM

## 2024-05-09 DIAGNOSIS — M51.9 UNSPECIFIED THORACIC, THORACOLUMBAR AND LUMBOSACRAL INTERVERTEBRAL DISC DISORDER: ICD-10-CM

## 2024-05-09 PROCEDURE — 99214 OFFICE O/P EST MOD 30 MIN: CPT

## 2024-05-09 RX ORDER — GABAPENTIN 100 MG/1
100 CAPSULE ORAL
Qty: 60 | Refills: 2 | Status: ACTIVE | COMMUNITY
Start: 2023-01-26

## 2024-05-09 NOTE — IMAGING
[de-identified] : LSPINE Palpation: No tenderness to palpation or spasm in bilateral thoracic and lumbar paraspinal musculature, no SI joint tenderness to palpation ROM: Full with stiffness Strength: 5/5 bilateral hip flexors, knee extensors, ankle dorsiflexors, EHL, ankle plantarflexors Sensation: diminished sensation to light touch in RLE above and below the knee Provocative maneuvers: Equivocal R straight leg raise, negative L straight leg raise

## 2024-05-09 NOTE — HISTORY OF PRESENT ILLNESS
[Work related] : work related [de-identified] : L spine MRI 2/10/23- Findings: Straightening of lordosis. No compression fracture. Multilevel Schmorl's nodes. Modic type 2  endplate change at L5-S1. No compression fracture. No spondylolysis. T12-L1: No herniation, foraminal stenosis, or central stenosis. L1-L2: Bulge, facet hypertrophy with no herniation, foraminal stenosis, or central stenosis. L2-L3: Minor retrolisthesis. Bulge, facet hypertrophy, ligamentum flavum hypertrophy, and left facet effusion  with foraminal stenosis. Asymmetric to right herniation impressing on the thecal sac. Posterior epidural  lipomatosis. Mild central stenosis. L3-L4: Bulge, facet hypertrophy, and ligamentum flavum hypertrophy with left foraminal herniation and left  foraminal stenosis. No central herniation. Posterior epidural lipomatosis. Mild central stenosis. L4-L5: Broad bulge, spondylotic ridge, facet arthrosis, ligamentum flavum hypertrophy, and left facet effusion. Foraminal extension and bulges with right foraminal herniation and foraminal stenosis right greater than left. Broad central herniation with posterior epidural lipomatosis and mild-to-moderate central stenosis. L5-S1: Minor retrolisthesis. Broad bulge, spondylotic ridge, facet arthrosis, ligamentum flavum hypertrophy with foraminal extension of bulges, and foraminal stenosis. Broad central herniation. Mild central stenosis. Ind. 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 1 month. Patient was moving heavy tables at work and started feeling pain shortly afterwards. Associated radiation down RLE (glutes,anterolateral thigh, lateral calf, dorsal foot, with numbness in the foot) Aggravated by walking/getting out of bed. Seen by PCP approx. 1 month ago and RXed pain patches and a pain reliever (does not recall names), with partial relief. Denies prior back surgeries/physical therapy. Denies b/b dysfunction.   PMH: HTN, Type II DM (insulin-dependent)  2/16/23- MRI f/u. Still having some pain down the RLE posterolaterally to the knee, though improving with time and meds  3/30/23- Continued lower back pain with radiation down RLE posterolaterally to the knee, although improving. Has been tolerating work (full duty).   6/1/23- Continued lower back pain with radiation down RLE posterolaterally to the knee. Is starting PT next week.   8/17/23- Still intermittent LBP. Notes subjective BLE weakness, without pain. Denies regina b/b dysfunction.  10/19/23- Still LBP with pain down the RLE posterolaterally to the foot. Reports PT has been approved.  12/14/23- Continued LBP. Radiation down RLE posterolaterally to calf. Reports relief with naproxen/PT, although further PT has been denied. No b/b dysfunction reported. 2/29/24-Patient is here today to follow up on lower back pain. He reports no change since the last visit. Intermittent radiation down RLE posterolaterally to calf.  He is still awaiting PT approval and states that Gabapentin and Naproxen has been giving him relief. Patient has recently retired.  05/09/2024: patient is here to follow on lower back pain patient states that his pain is intermittent since last visit. he is still taking medication when needed. pt c/o of trouble getting out the bed and numbness down the RLE. Pt was only approved for one day of PT.  [FreeTextEntry3] : 12/08/22 [FreeTextEntry5] : 02/29/2024: Patient is here today to follow up on lower back pain. He reports no change since the last visit. He is still awaiting PT approval and states that Gabapentin and Naproxen has been giving him relief.

## 2024-05-09 NOTE — RETURN TO WORK/SCHOOL
[Return Date: _____] : as of [unfilled].  This has been discussed in detail with ~Sheldon~ and ~he/she~ understands this. [Full Duty] : full duty glasses wears glasses

## 2024-05-09 NOTE — ASSESSMENT
[FreeTextEntry1] : L4/5 bulge with R>L LR and foraminal HNP; L5/S1 HNP with b/l NF narrowing PT, meds  NSAIDs- Patient warned of risk of medication to GI tract, increased blood pressure, cardiac risk, and risk of fluid retention. Advised to clear medication with internist or PCP if any concurrent health problem with heart, blood pressure, or GI system exists.  Gabapentin- Patient advised of sedating effects, instructed not to drive, operate machinery, or take with other sedating medications. Advised of need to taper on/off medication and risk of abruptly stopping gabapentin.

## 2024-06-27 ENCOUNTER — APPOINTMENT (OUTPATIENT)
Dept: ORTHOPEDIC SURGERY | Facility: CLINIC | Age: 69
End: 2024-06-27

## 2024-11-22 ENCOUNTER — OUTPATIENT (OUTPATIENT)
Dept: OUTPATIENT SERVICES | Facility: HOSPITAL | Age: 69
LOS: 1 days | End: 2024-11-22
Payer: COMMERCIAL

## 2024-11-22 ENCOUNTER — APPOINTMENT (OUTPATIENT)
Dept: NUCLEAR MEDICINE | Facility: IMAGING CENTER | Age: 69
End: 2024-11-22
Payer: COMMERCIAL

## 2024-11-22 DIAGNOSIS — R97.20 ELEVATED PROSTATE SPECIFIC ANTIGEN [PSA]: ICD-10-CM

## 2024-11-22 DIAGNOSIS — C61 MALIGNANT NEOPLASM OF PROSTATE: ICD-10-CM

## 2024-11-22 PROCEDURE — 78816 PET IMAGE W/CT FULL BODY: CPT | Mod: 26

## 2024-11-22 PROCEDURE — 78816 PET IMAGE W/CT FULL BODY: CPT

## 2024-11-22 PROCEDURE — A9595: CPT

## 2025-07-15 ENCOUNTER — EMERGENCY (EMERGENCY)
Facility: HOSPITAL | Age: 70
LOS: 0 days | Discharge: HOME | End: 2025-07-15
Attending: STUDENT IN AN ORGANIZED HEALTH CARE EDUCATION/TRAINING PROGRAM
Payer: COMMERCIAL

## 2025-07-15 VITALS
RESPIRATION RATE: 19 BRPM | OXYGEN SATURATION: 98 % | SYSTOLIC BLOOD PRESSURE: 143 MMHG | HEIGHT: 71 IN | DIASTOLIC BLOOD PRESSURE: 78 MMHG | HEART RATE: 68 BPM | WEIGHT: 179.9 LBS | TEMPERATURE: 99 F

## 2025-07-15 VITALS
TEMPERATURE: 98 F | DIASTOLIC BLOOD PRESSURE: 87 MMHG | SYSTOLIC BLOOD PRESSURE: 135 MMHG | HEART RATE: 74 BPM | RESPIRATION RATE: 15 BRPM | OXYGEN SATURATION: 99 %

## 2025-07-15 DIAGNOSIS — Z79.4 LONG TERM (CURRENT) USE OF INSULIN: ICD-10-CM

## 2025-07-15 DIAGNOSIS — I10 ESSENTIAL (PRIMARY) HYPERTENSION: ICD-10-CM

## 2025-07-15 DIAGNOSIS — E11.65 TYPE 2 DIABETES MELLITUS WITH HYPERGLYCEMIA: ICD-10-CM

## 2025-07-15 DIAGNOSIS — R73.9 HYPERGLYCEMIA, UNSPECIFIED: ICD-10-CM

## 2025-07-15 LAB
ALBUMIN SERPL ELPH-MCNC: 3.2 G/DL — LOW (ref 3.3–5)
ALP SERPL-CCNC: 119 U/L — SIGNIFICANT CHANGE UP (ref 40–120)
ALT FLD-CCNC: 24 U/L — SIGNIFICANT CHANGE UP (ref 12–78)
ANION GAP SERPL CALC-SCNC: 8 MMOL/L — SIGNIFICANT CHANGE UP (ref 5–17)
APPEARANCE UR: CLEAR — SIGNIFICANT CHANGE UP
AST SERPL-CCNC: 17 U/L — SIGNIFICANT CHANGE UP (ref 15–37)
BACTERIA # UR AUTO: ABNORMAL /HPF
BASE EXCESS BLDV CALC-SCNC: 4.8 MMOL/L — HIGH (ref -2–3)
BASOPHILS # BLD AUTO: 0.05 K/UL — SIGNIFICANT CHANGE UP (ref 0–0.2)
BASOPHILS NFR BLD AUTO: 0.5 % — SIGNIFICANT CHANGE UP (ref 0–2)
BILIRUB SERPL-MCNC: 0.5 MG/DL — SIGNIFICANT CHANGE UP (ref 0.2–1.2)
BILIRUB UR-MCNC: NEGATIVE — SIGNIFICANT CHANGE UP
BLOOD GAS COMMENTS, VENOUS: SIGNIFICANT CHANGE UP
BUN SERPL-MCNC: 20 MG/DL — SIGNIFICANT CHANGE UP (ref 7–23)
CALCIUM SERPL-MCNC: 9.1 MG/DL — SIGNIFICANT CHANGE UP (ref 8.5–10.1)
CHLORIDE BLDV-SCNC: 99 MMOL/L — SIGNIFICANT CHANGE UP (ref 98–107)
CHLORIDE SERPL-SCNC: 99 MMOL/L — SIGNIFICANT CHANGE UP (ref 96–108)
CO2 BLDV-SCNC: 32 MMOL/L — HIGH (ref 22–26)
CO2 SERPL-SCNC: 28 MMOL/L — SIGNIFICANT CHANGE UP (ref 22–31)
COLOR SPEC: YELLOW — SIGNIFICANT CHANGE UP
CREAT SERPL-MCNC: 1.47 MG/DL — HIGH (ref 0.5–1.3)
DIFF PNL FLD: ABNORMAL
EGFR: 51 ML/MIN/1.73M2 — LOW
EGFR: 51 ML/MIN/1.73M2 — LOW
EOSINOPHIL # BLD AUTO: 0.2 K/UL — SIGNIFICANT CHANGE UP (ref 0–0.5)
EOSINOPHIL NFR BLD AUTO: 1.9 % — SIGNIFICANT CHANGE UP (ref 0–6)
EPI CELLS # UR: PRESENT
GAS PNL BLDV: 135 MMOL/L — LOW (ref 136–145)
GAS PNL BLDV: SIGNIFICANT CHANGE UP
GAS PNL BLDV: SIGNIFICANT CHANGE UP
GLUCOSE BLDV-MCNC: SIGNIFICANT CHANGE UP MG/DL (ref 65–95)
GLUCOSE SERPL-MCNC: 570 MG/DL — CRITICAL HIGH (ref 70–99)
GLUCOSE UR QL: >=1000 MG/DL
HCO3 BLDV-SCNC: 31 MMOL/L — HIGH (ref 22–28)
HCT VFR BLD CALC: 33.9 % — LOW (ref 39–50)
HCT VFR BLDA CALC: 38 % — SIGNIFICANT CHANGE UP (ref 37–47)
HGB BLD CALC-MCNC: 12.5 G/DL — LOW (ref 12.6–17.4)
HGB BLD-MCNC: 11.5 G/DL — LOW (ref 13–17)
HOROWITZ INDEX BLDV+IHG-RTO: SIGNIFICANT CHANGE UP
IMM GRANULOCYTES NFR BLD AUTO: 0.4 % — SIGNIFICANT CHANGE UP (ref 0–0.9)
KETONES UR QL: NEGATIVE MG/DL — SIGNIFICANT CHANGE UP
LACTATE BLDV-MCNC: 1.2 MMOL/L — SIGNIFICANT CHANGE UP (ref 0.56–1.39)
LEUKOCYTE ESTERASE UR-ACNC: NEGATIVE — SIGNIFICANT CHANGE UP
LIDOCAIN IGE QN: 86 U/L — HIGH (ref 13–75)
LYMPHOCYTES # BLD AUTO: 28.7 % — SIGNIFICANT CHANGE UP (ref 13–44)
LYMPHOCYTES # BLD AUTO: 3 K/UL — SIGNIFICANT CHANGE UP (ref 1–3.3)
MAGNESIUM SERPL-MCNC: 2.3 MG/DL — SIGNIFICANT CHANGE UP (ref 1.6–2.6)
MCHC RBC-ENTMCNC: 29.7 PG — SIGNIFICANT CHANGE UP (ref 27–34)
MCHC RBC-ENTMCNC: 33.9 G/DL — SIGNIFICANT CHANGE UP (ref 32–36)
MCV RBC AUTO: 87.6 FL — SIGNIFICANT CHANGE UP (ref 80–100)
MONOCYTES # BLD AUTO: 0.61 K/UL — SIGNIFICANT CHANGE UP (ref 0–0.9)
MONOCYTES NFR BLD AUTO: 5.8 % — SIGNIFICANT CHANGE UP (ref 2–14)
NEUTROPHILS # BLD AUTO: 6.55 K/UL — SIGNIFICANT CHANGE UP (ref 1.8–7.4)
NEUTROPHILS NFR BLD AUTO: 62.7 % — SIGNIFICANT CHANGE UP (ref 43–77)
NITRITE UR-MCNC: NEGATIVE — SIGNIFICANT CHANGE UP
NRBC BLD AUTO-RTO: 0 /100 WBCS — SIGNIFICANT CHANGE UP (ref 0–0)
PCO2 BLDV: 51 MMHG — SIGNIFICANT CHANGE UP (ref 42–55)
PH BLDV: 7.39 — SIGNIFICANT CHANGE UP (ref 7.32–7.43)
PH UR: 7 — SIGNIFICANT CHANGE UP (ref 5–8)
PHOSPHATE SERPL-MCNC: 2.9 MG/DL — SIGNIFICANT CHANGE UP (ref 2.5–4.5)
PLATELET # BLD AUTO: 276 K/UL — SIGNIFICANT CHANGE UP (ref 150–400)
PO2 BLDV: 28 MMHG — SIGNIFICANT CHANGE UP (ref 25–45)
POTASSIUM BLDV-SCNC: 3 MMOL/L — LOW (ref 3.5–5.1)
POTASSIUM SERPL-MCNC: 3 MMOL/L — LOW (ref 3.5–5.3)
POTASSIUM SERPL-SCNC: 3 MMOL/L — LOW (ref 3.5–5.3)
PROT SERPL-MCNC: 7.1 GM/DL — SIGNIFICANT CHANGE UP (ref 6–8.3)
PROT UR-MCNC: NEGATIVE MG/DL — SIGNIFICANT CHANGE UP
RBC # BLD: 3.87 M/UL — LOW (ref 4.2–5.8)
RBC # FLD: 14.2 % — SIGNIFICANT CHANGE UP (ref 10.3–14.5)
RBC CASTS # UR COMP ASSIST: 4 /HPF — SIGNIFICANT CHANGE UP (ref 0–4)
SAO2 % BLDV: 47.4 % — LOW (ref 94–98)
SODIUM SERPL-SCNC: 135 MMOL/L — SIGNIFICANT CHANGE UP (ref 135–145)
SP GR SPEC: 1.03 — SIGNIFICANT CHANGE UP (ref 1–1.03)
UROBILINOGEN FLD QL: 1 MG/DL — SIGNIFICANT CHANGE UP (ref 0.2–1)
WBC # BLD: 10.45 K/UL — SIGNIFICANT CHANGE UP (ref 3.8–10.5)
WBC # FLD AUTO: 10.45 K/UL — SIGNIFICANT CHANGE UP (ref 3.8–10.5)
WBC UR QL: 2 /HPF — SIGNIFICANT CHANGE UP (ref 0–5)

## 2025-07-15 PROCEDURE — 71045 X-RAY EXAM CHEST 1 VIEW: CPT | Mod: 26

## 2025-07-15 PROCEDURE — 99285 EMERGENCY DEPT VISIT HI MDM: CPT

## 2025-07-15 PROCEDURE — 93010 ELECTROCARDIOGRAM REPORT: CPT

## 2025-07-15 RX ADMIN — Medication 8 UNIT(S): at 17:44

## 2025-07-15 RX ADMIN — Medication 2000 MILLILITER(S): at 15:56

## 2025-07-15 NOTE — ED PROVIDER NOTE - CLINICAL SUMMARY MEDICAL DECISION MAKING FREE TEXT BOX
70-year-old male with history of hypertension and diabetes presenting to the ED from PCP office with reported hypoglycemia, patient is limited historian, reports he is on insulin for diabetes, does not recall his regimen, patient with no acute complaints of chest pain shortness of breath or abdominal pain reports no nausea vomiting or diarrhea.  No reported fever chills cough congestion.  He is otherwise well-appearing his fingerstick is reading greater than 600.  Will obtain labs venous blood gas screening chest x-ray EKG, rule out DKA IV fluid bolus and reassess. 70-year-old male with history of hypertension and diabetes presenting to the ED from PCP office with reported hypoglycemia, patient is limited historian, reports he is on insulin for diabetes, does not recall his regimen, patient with no acute complaints of chest pain shortness of breath or abdominal pain reports no nausea vomiting or diarrhea.  No reported fever chills cough congestion.  He is otherwise well-appearing his fingerstick is reading greater than 600.  Will obtain labs venous blood gas screening chest x-ray EKG, rule out DKA IV fluid bolus and reassess.    Hyperglycemia, downtrending, patient denies any current complaints, patient offered admission for diabetic education and management and declined, patient reports he would like to follow-up with his primary care physician, will follow-up with his PCP tomorrow, return precautions discussed and agreeable with discharge plan. Reports he takes insulin at home for diabetic management.

## 2025-07-15 NOTE — SBIRT NOTE ADULT - NSSBIRTFAILEDEXP_GEN_A_CORE
Hyperlipidemia, unspecified hyperlipidemia type
Hyperlipidemia, unspecified hyperlipidemia type
Immunosuppression
Never

## 2025-07-15 NOTE — ED ADULT NURSE NOTE - OBJECTIVE STATEMENT
patient alert and oriented x4. Patient 1 year old male w/ PMH HTN, DM, HLD, presenting for hyperglycemia. Patient states he went to PCP today and was told his BG was high and was sent to the ER. Denies pain, complaints. Endorsing polyuria. Patient placed on portable cardiac monitor.

## 2025-07-15 NOTE — ED PROVIDER NOTE - PATIENT PORTAL LINK FT
You can access the FollowMyHealth Patient Portal offered by [facility] by registering at the following website: [email or web address]. By joining [facility]’s FollowMyHealth portal, you will also be able to view your health information using other applications (apps) compatible with our system.

## 2025-07-16 LAB
A1C WITH ESTIMATED AVERAGE GLUCOSE RESULT: 12.8 % — HIGH (ref 4–5.6)
B-OH-BUTYR SERPL-SCNC: 0.3 MMOL/L — SIGNIFICANT CHANGE UP
ESTIMATED AVERAGE GLUCOSE: 321 MG/DL — HIGH (ref 68–114)
OSMOLALITY SERPL: 312 MOSMOL/KG — HIGH (ref 280–301)